# Patient Record
Sex: MALE | Race: WHITE | HISPANIC OR LATINO
[De-identification: names, ages, dates, MRNs, and addresses within clinical notes are randomized per-mention and may not be internally consistent; named-entity substitution may affect disease eponyms.]

---

## 2019-12-30 PROBLEM — Z00.00 ENCOUNTER FOR PREVENTIVE HEALTH EXAMINATION: Status: ACTIVE | Noted: 2019-12-30

## 2021-11-18 ENCOUNTER — RESULT REVIEW (OUTPATIENT)
Age: 76
End: 2021-11-18

## 2022-05-18 ENCOUNTER — RESULT REVIEW (OUTPATIENT)
Age: 77
End: 2022-05-18

## 2022-08-24 RX ORDER — FINASTERIDE 5 MG/1
5 TABLET, FILM COATED ORAL
Refills: 0 | Status: ACTIVE | COMMUNITY

## 2022-08-24 RX ORDER — TURMERIC ROOT EXTRACT 500 MG
TABLET ORAL
Refills: 0 | Status: ACTIVE | COMMUNITY

## 2022-08-24 RX ORDER — MULTIVITAMIN
TABLET ORAL
Refills: 0 | Status: ACTIVE | COMMUNITY

## 2022-08-25 ENCOUNTER — APPOINTMENT (OUTPATIENT)
Dept: THORACIC SURGERY | Facility: CLINIC | Age: 77
End: 2022-08-25

## 2022-08-25 VITALS
BODY MASS INDEX: 28.34 KG/M2 | HEIGHT: 68 IN | OXYGEN SATURATION: 97 % | SYSTOLIC BLOOD PRESSURE: 142 MMHG | TEMPERATURE: 98 F | DIASTOLIC BLOOD PRESSURE: 78 MMHG | RESPIRATION RATE: 14 BRPM | HEART RATE: 71 BPM | WEIGHT: 187 LBS

## 2022-08-25 DIAGNOSIS — Z83.3 FAMILY HISTORY OF DIABETES MELLITUS: ICD-10-CM

## 2022-08-25 DIAGNOSIS — Z78.9 OTHER SPECIFIED HEALTH STATUS: ICD-10-CM

## 2022-08-25 DIAGNOSIS — K57.90 DIVERTICULOSIS OF INTESTINE, PART UNSPECIFIED, W/OUT PERFORATION OR ABSCESS W/OUT BLEEDING: ICD-10-CM

## 2022-08-25 DIAGNOSIS — Z86.2 PERSONAL HISTORY OF DISEASES OF THE BLOOD AND BLOOD-FORMING ORGANS AND CERTAIN DISORDERS INVOLVING THE IMMUNE MECHANISM: ICD-10-CM

## 2022-08-25 DIAGNOSIS — Z87.19 PERSONAL HISTORY OF OTHER DISEASES OF THE DIGESTIVE SYSTEM: ICD-10-CM

## 2022-08-25 DIAGNOSIS — Z87.438 PERSONAL HISTORY OF OTHER DISEASES OF MALE GENITAL ORGANS: ICD-10-CM

## 2022-08-25 DIAGNOSIS — R73.03 PREDIABETES.: ICD-10-CM

## 2022-08-25 DIAGNOSIS — Z86.39 PERSONAL HISTORY OF OTHER ENDOCRINE, NUTRITIONAL AND METABOLIC DISEASE: ICD-10-CM

## 2022-08-25 PROCEDURE — 99204 OFFICE O/P NEW MOD 45 MIN: CPT

## 2022-08-25 NOTE — REVIEW OF SYSTEMS
[Feeling Tired] : feeling tired [Chest Pain] : chest pain [Heartburn] : heartburn [Negative] : Heme/Lymph

## 2022-08-26 NOTE — HISTORY OF PRESENT ILLNESS
[FreeTextEntry1] : Mr. MILES LLANES is a 76 year old male referred by Dr. Augustine Asencio who presents for consultation regarding large paraesophageal hiatal hernia. \par \par His pertinent past medical history includes diabetes, diverticulosis, hyperlipidemia, BPH, GERD\par \par Today, the patient reports pain when he eats with a lot of burping and sometimes even feels like chokes with some foods, even water but often rice. He feels a lot of pressure in the chest after eating that can persist for up to an hour. This has been an ongoing problem since the end of last year. At that time he received medication (antacids) and when it didn't seem to work he underwent an endoscopy which should a large hiatal hernia. He had endoscopies in the past and was under the impression the hernia was present but small at that time and now it is larger. Denies vomiting, fevers, palpitations.

## 2022-08-26 NOTE — ASSESSMENT
[FreeTextEntry1] : I had the pleasure of seeing Mr. LLANES in the office today for surgical evaluation.\par \par Briefly, this is a 76 year old male with a history of gastroesophageal reflux disease, sarcoidosis, hyperlipidemia, BPH and diverticulosis and now presents with large paraesophageal hernia for surgical evaluation. I have fully reviewed and evaluated all available results. \par \par Following thorough discussion regarding risk, benefit and alternatives to surgical intervention, we have determined that Mr. LLANES will be proceeding with upper endoscopy, robot assisted hiatal hernia repair with fundoplication at [facility] on 10/12/22. The planned procedure, hospital stay and recovery was discussed in detail. All risks, benefits and alternatives were discussed at length with the patient. All questions addressed. The patient fully understood and would like to proceed with surgical intervention as discussed.\par \par Preoperative checklist:\par - Noncontrast CT chest and abdomen\par - Medical clearance\par - Presurgical testing to be scheduled\par \par Plan:\par - Upper endoscopy, Robot-assisted hiatal hernia repair with fundoplication on 10/12/22 at [facility]\par \par Silverio RUIZ PA, am scribing for and in the presence of Dr. Garsia the following sections HISTORY OF PRESENT ILLNESS, PAST MEDICAL/FAMILY/SOCIAL HISTORY; REVIEW OF SYSTEMS, VITAL SIGNS, PHYSICAL EXAM, ASSESSMENT, PLAN AND DISPOSITION.\par \par "I personally performed the services described in the documentation and reviewed the documentation recorded by the scribe in my presence which accurately and completely recorded my words and actions."

## 2022-08-26 NOTE — CONSULT LETTER
[Dear  ___] : Dear  [unfilled], [Courtesy Letter:] : I had the pleasure of seeing your patient, [unfilled], in my office today. [Please see my note below.] : Please see my note below. [Consult Closing:] : Thank you very much for allowing me to participate in the care of this patient.  If you have any questions, please do not hesitate to contact me. [Sincerely,] : Sincerely, [FreeTextEntry2] : Augustine Asencio MD [FreeTextEntry3] : Vladislav Garsia MD\par Director of Thoracic Surgery, [facility]\par  Cardiovascular & Thoracic Surgery\par [facility]\par [address] [place] [address]\[place] [address]\par Tel. [phone]\par Fax [phone]

## 2022-08-26 NOTE — DATA REVIEWED
[FreeTextEntry1] : Upper GI series\par 7/15/22\par \par small hiatal hernia with large paraesophageal hernia.

## 2022-08-26 NOTE — PHYSICAL EXAM
[General Appearance - Alert] : alert [General Appearance - In No Acute Distress] : in no acute distress [Neck Appearance] : the appearance of the neck was normal [Respiration, Rhythm And Depth] : normal respiratory rhythm and effort [Auscultation Breath Sounds / Voice Sounds] : lungs were clear to auscultation bilaterally [Heart Rate And Rhythm] : heart rate was normal and rhythm regular [Heart Sounds] : normal S1 and S2 [Bowel Sounds] : normal bowel sounds [Abdomen Soft] : soft [Cervical Lymph Nodes Enlarged Anterior Bilaterally] : anterior cervical [Abnormal Walk] : normal gait [No Focal Deficits] : no focal deficits [Oriented To Time, Place, And Person] : oriented to person, place, and time [Impaired Insight] : insight and judgment were intact [FreeTextEntry1] : modereate lower extremity swelling left leg with skin discoloration

## 2022-10-12 ENCOUNTER — RESULT REVIEW (OUTPATIENT)
Age: 77
End: 2022-10-12

## 2022-10-12 ENCOUNTER — APPOINTMENT (OUTPATIENT)
Dept: THORACIC SURGERY | Facility: HOSPITAL | Age: 77
End: 2022-10-12

## 2022-11-03 ENCOUNTER — APPOINTMENT (OUTPATIENT)
Dept: THORACIC SURGERY | Facility: CLINIC | Age: 77
End: 2022-11-03

## 2022-11-03 VITALS
OXYGEN SATURATION: 97 % | DIASTOLIC BLOOD PRESSURE: 80 MMHG | SYSTOLIC BLOOD PRESSURE: 157 MMHG | RESPIRATION RATE: 16 BRPM | HEART RATE: 79 BPM

## 2022-11-03 PROCEDURE — 99024 POSTOP FOLLOW-UP VISIT: CPT

## 2022-11-18 ENCOUNTER — RESULT REVIEW (OUTPATIENT)
Age: 77
End: 2022-11-18

## 2022-12-01 ENCOUNTER — APPOINTMENT (OUTPATIENT)
Dept: THORACIC SURGERY | Facility: CLINIC | Age: 77
End: 2022-12-01

## 2022-12-01 VITALS
SYSTOLIC BLOOD PRESSURE: 145 MMHG | RESPIRATION RATE: 16 BRPM | BODY MASS INDEX: 25.46 KG/M2 | HEART RATE: 68 BPM | OXYGEN SATURATION: 98 % | WEIGHT: 168 LBS | HEIGHT: 68 IN | DIASTOLIC BLOOD PRESSURE: 78 MMHG

## 2022-12-01 PROCEDURE — 99024 POSTOP FOLLOW-UP VISIT: CPT

## 2022-12-01 RX ORDER — AMOXICILLIN 875 MG/1
875 TABLET, FILM COATED ORAL
Qty: 14 | Refills: 0 | Status: COMPLETED | COMMUNITY
Start: 2022-10-26

## 2022-12-01 RX ORDER — CICLOPIROX 80 MG/ML
8 SOLUTION TOPICAL
Qty: 7 | Refills: 0 | Status: ACTIVE | COMMUNITY
Start: 2022-08-02

## 2022-12-01 RX ORDER — BROMFENAC 1.03 MG/ML
0.09 SOLUTION/ DROPS OPHTHALMIC
Qty: 2 | Refills: 0 | Status: ACTIVE | COMMUNITY
Start: 2022-09-12

## 2022-12-01 RX ORDER — OXYCODONE AND ACETAMINOPHEN 5; 325 MG/1; MG/1
5-325 TABLET ORAL
Qty: 24 | Refills: 0 | Status: COMPLETED | COMMUNITY
Start: 2022-10-14

## 2022-12-01 RX ORDER — CIPROFLOXACIN HYDROCHLORIDE 500 MG/1
500 TABLET, FILM COATED ORAL
Qty: 10 | Refills: 0 | Status: COMPLETED | COMMUNITY
Start: 2022-05-31

## 2022-12-01 NOTE — PHYSICAL EXAM
[Respiration, Rhythm And Depth] : normal respiratory rhythm and effort [Auscultation Breath Sounds / Voice Sounds] : lungs were clear to auscultation bilaterally [Heart Rate And Rhythm] : heart rate was normal and rhythm regular [Site: ___] : Site: [unfilled] [Clean] : clean [Dry] : dry [Healing Well] : healing well Average

## 2022-12-02 NOTE — REASON FOR VISIT
[Spouse] : spouse [de-identified] : upper endoscopy/robotic assisted paraesophageal hernia repair with fabricio fundoplication [de-identified] : 10/12/22 [de-identified] : Patient had surgery performed at [facility].\par \par He presents today for a follow up appointment.

## 2022-12-02 NOTE — PROCEDURE
[FreeTextEntry1] : Accession: Collected Date/Time: Received Date/Time:\par [id number] 10/12/2022 14:30 EDT 10/13/2022 08:53 EDT\par \par \par Surgical Pathology Report\par Diagnosis\par Hernia sac, resection:\par * Fibromembranous and adipose tissue with vascular congestion.\par \par Anton Eugene MD (Electronic Signature).

## 2022-12-02 NOTE — ASSESSMENT
[FreeTextEntry1] : Mr Azevedo reports to the office today to discuss his post operative care. He is doing well overall and has complaints of only gas and bloating after meals. He has no complaints of dysphagia and continues to advance his diet. I would like to see him back on the office in 3 months to discuss progress. At this time he has no dietary restrictions.\par \par PLAN:\par - Return to care in 3 months\par \par \par \par \par \par \par \par \par IPhi NP am scribing for and in the presence of Dr. Garsia the following sections HISTORY OF PRESENT ILLNESS, PAST MEDICAL/FAMILY/SOCIAL HISTORY; REVIEW OF SYSTEMS; VITAL SIGNS; PHYSICAL EXAM; DISPOSITION.\par \par "I personally performed the services described in the documentation, reviewed the documentation recorded by the scribe in my presence and accurately and completely records my words and actions."\par

## 2023-03-02 ENCOUNTER — APPOINTMENT (OUTPATIENT)
Dept: THORACIC SURGERY | Facility: CLINIC | Age: 78
End: 2023-03-02
Payer: MEDICARE

## 2023-03-02 VITALS
TEMPERATURE: 98.2 F | HEIGHT: 68 IN | DIASTOLIC BLOOD PRESSURE: 80 MMHG | WEIGHT: 168 LBS | BODY MASS INDEX: 25.46 KG/M2 | OXYGEN SATURATION: 99 % | HEART RATE: 68 BPM | SYSTOLIC BLOOD PRESSURE: 146 MMHG | RESPIRATION RATE: 16 BRPM

## 2023-03-02 PROCEDURE — 99213 OFFICE O/P EST LOW 20 MIN: CPT

## 2023-03-02 RX ORDER — LANSOPRAZOLE 15 MG/1
15 CAPSULE, DELAYED RELEASE ORAL
Refills: 0 | Status: COMPLETED | COMMUNITY
End: 2023-03-02

## 2023-03-02 RX ORDER — CLOBETASOL PROPIONATE 0.5 MG/ML
0.05 SOLUTION TOPICAL
Refills: 0 | Status: COMPLETED | COMMUNITY
End: 2023-03-02

## 2023-03-02 RX ORDER — DEXLANSOPRAZOLE 60 MG/1
60 CAPSULE, DELAYED RELEASE ORAL
Refills: 0 | Status: COMPLETED | COMMUNITY
End: 2023-03-02

## 2023-03-02 RX ORDER — DICYCLOMINE HYDROCHLORIDE 10 MG/1
10 CAPSULE ORAL
Refills: 0 | Status: COMPLETED | COMMUNITY
End: 2023-03-02

## 2023-03-02 NOTE — PHYSICAL EXAM
[Sclera] : the sclera and conjunctiva were normal [Neck Appearance] : the appearance of the neck was normal [] : no respiratory distress [Respiration, Rhythm And Depth] : normal respiratory rhythm and effort [Auscultation Breath Sounds / Voice Sounds] : lungs were clear to auscultation bilaterally [Heart Rate And Rhythm] : heart rate was normal and rhythm regular [Heart Sounds] : normal S1 and S2 [Examination Of The Chest] : the chest was normal in appearance [Bowel Sounds] : normal bowel sounds [Abdomen Soft] : soft [Abdomen Tenderness] : non-tender [Abnormal Walk] : normal gait [Skin Color & Pigmentation] : normal skin color and pigmentation [Sensation] : the sensory exam was normal to light touch and pinprick [Motor Exam] : the motor exam was normal [Oriented To Time, Place, And Person] : oriented to person, place, and time [Impaired Insight] : insight and judgment were intact [Affect] : the affect was normal [Mood] : the mood was normal

## 2023-03-03 NOTE — REVIEW OF SYSTEMS
[Negative] : Heme/Lymph [Fever] : no fever [Chills] : no chills [Feeling Poorly] : not feeling poorly [Feeling Tired] : not feeling tired [Chest Pain] : no chest pain [Palpitations] : no palpitations [Shortness Of Breath] : no shortness of breath [Wheezing] : no wheezing [Cough] : no cough [SOB on Exertion] : no shortness of breath during exertion [FreeTextEntry7] : intermittent diarrhea - see HPI

## 2023-03-03 NOTE — HISTORY OF PRESENT ILLNESS
[FreeTextEntry1] : Mac Azevedo is a 77 year old male who presents for a follow up appointment. He is status post upper endoscopy/robotic assisted paraesophageal hernia repair with fabricio fundoplication on 10/12/22 at [facility]. He presents to the office today for a clinical follow up.\par \par His pertinent past medical history includes diabetes, diverticulosis, hyperlipidemia, BPH, GERD.\par \par Today Mr. Azevedo reports that he is tolerating a regular diet, he has no complaints of dysphagia. He does report some diarrhea intermittently after eating, but is unable to determine particular foods that cause symptoms. He denies chest pain, palpitations, shortness of breath, cough, fevers, chills, fatigue, unintentional weight loss or gain, and night sweats.

## 2023-03-03 NOTE — ASSESSMENT
[FreeTextEntry1] : Patient presents to appointment today, accompanied by his daughter, to discuss his post operative care. I am overall happy with his progress; he has no complaints of dysphagia and is eating a regular diet. He does report some diarrhea intermittently after eating; we discussed that he try to determine foods that cause the diarrhea and avoid. I am recommending that he follow up with GI for an endoscopy and return to care in office in four months for clinical evaluation, sooner if needed. All questions answered, patient verbalizes understanding.\par \par \par PLAN:\par - Follow up with GI for endoscopy\par - Return to care in the office in four months for clinical evaluation, sooner if needed.\par \par \par I, Dr. Garsia, personally performed the evaluation and management (E/M) services for this established patient who presents today with an existing condition(s).  That E/M includes conducting the examination, assessing all new/exacerbated conditions, and establishing a new plan of care.  Today, my ACP, Taina Sears NP, was here to observe my evaluation and management services for this new problem/exacerbated condition to be followed going forward.

## 2023-03-03 NOTE — DATA REVIEWED
[FreeTextEntry1] :  [facility]                Patient:  MILES LLANES\par \par [address]                           MRN:[id number]\par \[place] [address]                        :1945 Sex: Male\par \par Director:    Brenden Mcmillan M.D.               FIN:[id number]\par Ordering Physician:  Vladislav Garsia         Location:  3[facility]; 3517; 01\par \par \par Pathology Reports\par \par \par Accession:                        Collected Date/Time:              Received Date/Time:\par [id number]                    10/12/2022 14:30 EDT              10/13/2022 08:53 EDT\par \par \par Surgical Pathology Report\par Diagnosis\par Hernia sac, resection:\par *   Fibromembranous and adipose tissue with vascular congestion.\par \par Anton Eugene MD (Electronic Signature)\par

## 2023-07-13 ENCOUNTER — APPOINTMENT (OUTPATIENT)
Dept: THORACIC SURGERY | Facility: CLINIC | Age: 78
End: 2023-07-13
Payer: MEDICARE

## 2023-07-13 VITALS
BODY MASS INDEX: 25.16 KG/M2 | HEIGHT: 68 IN | WEIGHT: 166 LBS | HEART RATE: 60 BPM | OXYGEN SATURATION: 98 % | TEMPERATURE: 97.9 F | SYSTOLIC BLOOD PRESSURE: 137 MMHG | RESPIRATION RATE: 16 BRPM | DIASTOLIC BLOOD PRESSURE: 70 MMHG

## 2023-07-13 PROCEDURE — 99213 OFFICE O/P EST LOW 20 MIN: CPT

## 2023-07-13 RX ORDER — BROMFENAC 0.9 MG/ML
0.09 SOLUTION/ DROPS OPHTHALMIC
Refills: 0 | Status: COMPLETED | COMMUNITY
End: 2023-07-13

## 2023-07-14 NOTE — ASSESSMENT
[FreeTextEntry1] : I had the pleasure of seeing Mr. LLANES in the office today to discuss clinical progress following paraesophageal hernia repair with Ravinder fundoplication in October of 2022.\par \par Briefly, this is a 77 year old male with a history of a hiatal hernia status post repair last year who reports improvement in some symptoms but does have persistent diarrhea, the timing of which was near the time of surgery last year but unclear if it is related to the surgery. \par \par He is undergoing workup for diarrhea with the gastroenterologist and will continue to follow up with them regarding that complaint. Overall I feel the surgery was successful and I am happy with his progress. He will in the future require further monitoring. \par \par All questions were answered and concerns were addressed. \par \par Plan:\par - Return to care in three months\par - Patient is in full understanding and agreement with the plan going forward. \par \par I, Dr. Vladislav Garsia, personally performed the evaluation and management (E/M) services for this new patient.  That E/M includes conducting the initial examination, assessing all conditions, and establishing the plan of care.  Today, Silverio Richards PA-C, was here to observe my evaluation and management services for this patient to be followed going forward.

## 2023-07-14 NOTE — PHYSICAL EXAM
[Sclera] : the sclera and conjunctiva were normal [Neck Appearance] : the appearance of the neck was normal [Respiration, Rhythm And Depth] : normal respiratory rhythm and effort [Auscultation Breath Sounds / Voice Sounds] : lungs were clear to auscultation bilaterally [Heart Rate And Rhythm] : heart rate was normal and rhythm regular [Heart Sounds] : normal S1 and S2 [2+] : left 2+ [Bowel Sounds] : normal bowel sounds [Abdomen Soft] : soft [Cervical Lymph Nodes Enlarged Posterior Bilaterally] : posterior cervical [Cervical Lymph Nodes Enlarged Anterior Bilaterally] : anterior cervical [Abnormal Walk] : normal gait [Skin Color & Pigmentation] : normal skin color and pigmentation [Skin Turgor] : normal skin turgor [] : no rash [No Focal Deficits] : no focal deficits [Oriented To Time, Place, And Person] : oriented to person, place, and time [Impaired Insight] : insight and judgment were intact [Affect] : the affect was normal [Mood] : the mood was normal [FreeTextEntry2] : mild ankle swelling

## 2023-07-14 NOTE — HISTORY OF PRESENT ILLNESS
[FreeTextEntry1] : Mac Azevedo is a 77 year old male who presents for a follow up appointment. He is status post upper endoscopy/robotic assisted paraesophageal hernia repair with fabricio fundoplication on 10/12/22 at [facility]. He presents to the office today for a clinical follow up.\par \par His pertinent past medical history includes diabetes, diverticulosis, hyperlipidemia, BPH, GERD.\par \par Today he reports having some problems swallowing and feels like he is choking at times even just with the saliva but not too often. He does not have any acid reflux. He still has diarrhea which started after surgery. He has not been able to pinpoint any particular foods that give him a hard time. He has lost about ten pounds since surgery. Denies fevers, chills, shortness of breath, chest pain or fatigue.\par \par

## 2023-07-14 NOTE — DATA REVIEWED
[FreeTextEntry1] : Repeat endoscopy on 2023 report reviewed. \par \par [facility] Patient: MILES LLANES\par \par [address] MRN:[id number]\par \[place] [address] :1945 Sex: Male\par \par Director: Brenden Mcmillan M.D. FIN:[id number]\par Ordering Physician: Vladislav Garsia Location: [facility]; 3517; 01\par \par \par Pathology Reports\par \par \par Accession: Collected Date/Time: Received Date/Time:\par [id number] 10/12/2022 14:30 EDT 10/13/2022 08:53 EDT\par \par \par Surgical Pathology Report\par Diagnosis\par Hernia sac, resection:\par * Fibromembranous and adipose tissue with vascular congestion.\par \par Anton Eugene MD (Electronic Signature)\par

## 2023-07-14 NOTE — REVIEW OF SYSTEMS
[Diarrhea] : diarrhea [Dizziness] : dizziness [Negative] : Heme/Lymph [Fever] : no fever [Chills] : no chills

## 2023-09-21 ENCOUNTER — NON-APPOINTMENT (OUTPATIENT)
Age: 78
End: 2023-09-21

## 2023-09-21 ENCOUNTER — APPOINTMENT (OUTPATIENT)
Dept: OPHTHALMOLOGY | Facility: CLINIC | Age: 78
End: 2023-09-21
Payer: MEDICARE

## 2023-09-21 PROCEDURE — 92134 CPTRZ OPH DX IMG PST SGM RTA: CPT

## 2023-09-21 PROCEDURE — 92004 COMPRE OPH EXAM NEW PT 1/>: CPT

## 2023-10-12 ENCOUNTER — APPOINTMENT (OUTPATIENT)
Dept: THORACIC SURGERY | Facility: CLINIC | Age: 78
End: 2023-10-12
Payer: MEDICARE

## 2023-10-12 VITALS
TEMPERATURE: 98 F | SYSTOLIC BLOOD PRESSURE: 133 MMHG | HEART RATE: 68 BPM | RESPIRATION RATE: 15 BRPM | WEIGHT: 164 LBS | HEIGHT: 68 IN | OXYGEN SATURATION: 99 % | DIASTOLIC BLOOD PRESSURE: 69 MMHG | BODY MASS INDEX: 24.86 KG/M2

## 2023-10-12 DIAGNOSIS — K44.9 DIAPHRAGMATIC HERNIA W/OUT OBSTRUCTION OR GANGRENE: ICD-10-CM

## 2023-10-12 DIAGNOSIS — Z87.19 OTHER SPECIFIED POSTPROCEDURAL STATES: ICD-10-CM

## 2023-10-12 DIAGNOSIS — Z98.890 OTHER SPECIFIED POSTPROCEDURAL STATES: ICD-10-CM

## 2023-10-12 DIAGNOSIS — R13.19 OTHER DYSPHAGIA: ICD-10-CM

## 2023-10-12 PROCEDURE — 99214 OFFICE O/P EST MOD 30 MIN: CPT

## 2023-10-17 PROBLEM — R13.19 ESOPHAGEAL DYSPHAGIA: Status: ACTIVE | Noted: 2023-07-12

## 2023-10-17 PROBLEM — Z98.890 S/P REPAIR OF PARAESOPHAGEAL HERNIA: Status: ACTIVE | Noted: 2023-03-03

## 2023-10-17 PROBLEM — K44.9 PARAESOPHAGEAL HERNIA: Status: ACTIVE | Noted: 2022-08-26

## 2023-12-25 NOTE — COUNSELING
[Hygeine (Including Daily Shower)] : hygeine (including daily shower) [Importance of Regular Medical Follow-Up] : the importance of regular medical follow-up [No Heavy Lifting] : no heavy lifting (>15-20 lb. for 1 month or 25 lb. for 3 months from date of surgery) [Blood Pressure Control] : blood pressure control [S/S of infection] : signs and symptoms of infection (and to whom it should be reported) [Progressive Ambulation/Activity] : progressive ambulation/activity [Medication/Vitamin/Herb/Food Interaction] : medication/vitamin/herb/food interaction DISCHARGE

## 2024-09-26 ENCOUNTER — APPOINTMENT (OUTPATIENT)
Dept: OPHTHALMOLOGY | Facility: CLINIC | Age: 79
End: 2024-09-26
Payer: MEDICARE

## 2024-09-26 ENCOUNTER — NON-APPOINTMENT (OUTPATIENT)
Age: 79
End: 2024-09-26

## 2024-09-26 PROCEDURE — 92014 COMPRE OPH EXAM EST PT 1/>: CPT

## 2024-12-19 ENCOUNTER — APPOINTMENT (OUTPATIENT)
Dept: THORACIC SURGERY | Facility: CLINIC | Age: 79
End: 2024-12-19
Payer: MEDICARE

## 2024-12-19 VITALS
HEART RATE: 60 BPM | BODY MASS INDEX: 25.46 KG/M2 | WEIGHT: 168 LBS | DIASTOLIC BLOOD PRESSURE: 84 MMHG | HEIGHT: 68 IN | OXYGEN SATURATION: 98 % | SYSTOLIC BLOOD PRESSURE: 149 MMHG | RESPIRATION RATE: 16 BRPM

## 2024-12-19 DIAGNOSIS — K44.9 DIAPHRAGMATIC HERNIA W/OUT OBSTRUCTION OR GANGRENE: ICD-10-CM

## 2024-12-19 DIAGNOSIS — Z87.19 OTHER SPECIFIED POSTPROCEDURAL STATES: ICD-10-CM

## 2024-12-19 DIAGNOSIS — R13.19 OTHER DYSPHAGIA: ICD-10-CM

## 2024-12-19 DIAGNOSIS — Z98.890 OTHER SPECIFIED POSTPROCEDURAL STATES: ICD-10-CM

## 2024-12-19 PROCEDURE — 99212 OFFICE O/P EST SF 10 MIN: CPT
